# Patient Record
Sex: FEMALE
[De-identification: names, ages, dates, MRNs, and addresses within clinical notes are randomized per-mention and may not be internally consistent; named-entity substitution may affect disease eponyms.]

---

## 2017-05-18 ENCOUNTER — CHARTING TRANS (OUTPATIENT)
Dept: OTHER | Age: 32
End: 2017-05-18

## 2017-05-18 ASSESSMENT — PAIN SCALES - GENERAL: PAINLEVEL_OUTOF10: 2

## 2018-07-20 ENCOUNTER — HOSPITAL ENCOUNTER (OUTPATIENT)
Age: 33
Discharge: HOME | End: 2018-07-20
Attending: EMERGENCY MEDICINE
Payer: COMMERCIAL

## 2018-07-20 VITALS
SYSTOLIC BLOOD PRESSURE: 138 MMHG | RESPIRATION RATE: 17 BRPM | BODY MASS INDEX: 46.93 KG/M2 | TEMPERATURE: 99 F | HEIGHT: 62 IN | OXYGEN SATURATION: 99 % | WEIGHT: 255 LBS | HEART RATE: 82 BPM | DIASTOLIC BLOOD PRESSURE: 99 MMHG

## 2018-07-20 DIAGNOSIS — R03.0 ELEVATED BLOOD PRESSURE READING: Primary | ICD-10-CM

## 2018-07-20 PROCEDURE — 99202 OFFICE O/P NEW SF 15 MIN: CPT

## 2018-07-20 NOTE — ED PROVIDER NOTES
Patient Seen in: [address] [facility] [address]    History   Patient presents with:  Blood Pressure    Stated Complaint: high blood pressure    HPI    70-year-old female patient presents complaining of having elevated blood pressure reading Course as of Jul 20 1324  ------------------------------------------------------------      RAFA     Discussed elevated blood pressure reading for the first time.   I recommended that patient have her blood pressure checked several times  by time as

## 2018-07-20 NOTE — ED INITIAL ASSESSMENT (HPI)
Pt reports was at a biometrics fair and blood pressure was elevated. Pt at this time denies any pain denies headache or any other symptoms.

## 2018-11-03 VITALS
TEMPERATURE: 98.9 F | DIASTOLIC BLOOD PRESSURE: 82 MMHG | HEIGHT: 61 IN | SYSTOLIC BLOOD PRESSURE: 120 MMHG | RESPIRATION RATE: 16 BRPM | BODY MASS INDEX: 47.01 KG/M2 | WEIGHT: 249 LBS | HEART RATE: 78 BPM